# Patient Record
Sex: FEMALE | Race: WHITE
[De-identification: names, ages, dates, MRNs, and addresses within clinical notes are randomized per-mention and may not be internally consistent; named-entity substitution may affect disease eponyms.]

---

## 2018-01-05 ENCOUNTER — APPOINTMENT (OUTPATIENT)
Dept: CARDIOLOGY | Facility: CLINIC | Age: 83
End: 2018-01-05

## 2018-11-27 PROBLEM — Z00.00 ENCOUNTER FOR PREVENTIVE HEALTH EXAMINATION: Status: ACTIVE | Noted: 2018-11-27

## 2018-11-28 ENCOUNTER — APPOINTMENT (OUTPATIENT)
Dept: FAMILY MEDICINE | Facility: CLINIC | Age: 83
End: 2018-11-28
Payer: MEDICARE

## 2018-11-28 VITALS
DIASTOLIC BLOOD PRESSURE: 82 MMHG | TEMPERATURE: 98 F | HEART RATE: 69 BPM | BODY MASS INDEX: 24.77 KG/M2 | OXYGEN SATURATION: 96 % | SYSTOLIC BLOOD PRESSURE: 124 MMHG | HEIGHT: 58 IN | WEIGHT: 118 LBS

## 2018-11-28 DIAGNOSIS — M54.9 DORSALGIA, UNSPECIFIED: ICD-10-CM

## 2018-11-28 DIAGNOSIS — M25.552 PAIN IN LEFT HIP: ICD-10-CM

## 2018-11-28 DIAGNOSIS — S72.002S FRACTURE OF UNSPECIFIED PART OF NECK OF LEFT FEMUR, SEQUELA: ICD-10-CM

## 2018-11-28 DIAGNOSIS — G47.9 SLEEP DISORDER, UNSPECIFIED: ICD-10-CM

## 2018-11-28 DIAGNOSIS — G89.29 DORSALGIA, UNSPECIFIED: ICD-10-CM

## 2018-11-28 DIAGNOSIS — Z87.891 PERSONAL HISTORY OF NICOTINE DEPENDENCE: ICD-10-CM

## 2018-11-28 PROCEDURE — 99204 OFFICE O/P NEW MOD 45 MIN: CPT

## 2018-11-28 RX ORDER — CLONAZEPAM 0.5 MG/1
0.5 TABLET, ORALLY DISINTEGRATING ORAL
Refills: 0 | Status: DISCONTINUED | COMMUNITY
End: 2018-11-28

## 2018-11-28 NOTE — PHYSICAL EXAM
[Well Developed] : well developed [Well Nourished] : well nourished [No Respiratory Distress] : no respiratory distress  [Clear to Auscultation] : lungs were clear to auscultation bilaterally [No Edema] : there was no peripheral edema [Soft] : abdomen soft [Non Tender] : non-tender [Normal Mental Status] : the patient's orientation, memory, attention, language and fund of knowledge were normal [Appropriate] : appropriate [Impaired judgment] : intact judgment [Impaired Insight] : intact insight [de-identified] : Wheeze throughout [de-identified] : Loud murmur [de-identified] : Weight bearing with assistance and needed support to remain standing.

## 2018-11-28 NOTE — ASSESSMENT
[FreeTextEntry1] : Daughter needs assistance with mother's care.  Has borrowed equipment.  Needs physical therapy in home.

## 2018-11-28 NOTE — HISTORY OF PRESENT ILLNESS
[Family Member] : family member [de-identified] : New Patient visit in follow up from hospital. She has just come to live with her daughter. She had flare of ABM and Hgb in hospital was 7. At discharge Hgb up to 10. Plan had been for her to have colonoscopy in hospital but her daughter refused.\par Patient had been living with her son who had not been giving her medications on a consistent basis.  Multiple medications were in bottles.  Patient has had care in [place] and had been to [facility].\par Daughter wants her mom to establish care and see specialists. She has follow up with Dr Carr, hematology from hospital.  \par

## 2018-11-28 NOTE — REVIEW OF SYSTEMS
[Negative] : Heme/Lymph [Dyspnea on Exertion] : dyspnea on exertion [Melena] : melsiddharth [Abdominal Pain] : no abdominal pain [FreeTextEntry5] : Severe aortic stenosis, has not had valve replacement as she has ABM and would bleed if on anticoagulants [FreeTextEntry6] : Uses nebulizer BID for COPD. Not on any steroid inhaler.   [FreeTextEntry7] : To hospital due to melena and rest, fluids and transfusions if needed [FreeTextEntry9] : Lower back pain and left hip pain.Had hairline fracture but no surgery due to cardiac history and GI bleeding.  In wheelchair,  can stand with assistance.   [de-identified] : Chest port present in right upper chest.  [de-identified] : On seizure medication as she was having episodes of stiffening.  It was thought she was having reaction to Tramadol.

## 2018-11-28 NOTE — PLAN
[FreeTextEntry1] : Multiple specialty visits ordered. Daughter is starting with hematology and GI and has selected physicians. \par Will see [facility] cardiology and pulmonary. \par Needs foot care from podiatrist. \par Lasix dose decreased from 60 mg to 20 mg until blood work seen.  Patient is not on potassium supplements at this time.

## 2018-11-28 NOTE — HEALTH RISK ASSESSMENT
[Patient not ambulatory (Wheelchair)] : Patient is not ambulatory (Wheelchair) [Language] : difficulty with language [Cultural] : cultural [Transportation] : transportation [Retired] : retired [] : No [Reports changes in hearing] : Reports no changes in hearing [Reports changes in vision] : Reports no changes in vision [Reports changes in dental health] : Reports no changes in dental health [de-identified] : speaks Portuguese [de-identified] : daughter setting up home for mother [de-identified] : with daughter

## 2018-12-03 ENCOUNTER — NON-APPOINTMENT (OUTPATIENT)
Age: 83
End: 2018-12-03

## 2018-12-03 ENCOUNTER — APPOINTMENT (OUTPATIENT)
Dept: CARDIOLOGY | Facility: CLINIC | Age: 83
End: 2018-12-03
Payer: MEDICARE

## 2018-12-03 VITALS
HEART RATE: 68 BPM | HEIGHT: 58 IN | DIASTOLIC BLOOD PRESSURE: 75 MMHG | OXYGEN SATURATION: 98 % | WEIGHT: 118 LBS | SYSTOLIC BLOOD PRESSURE: 155 MMHG | BODY MASS INDEX: 24.77 KG/M2

## 2018-12-03 DIAGNOSIS — G83.14 MONOPLEGIA OF LOWER LIMB AFFECTING LEFT NONDOMINANT SIDE: ICD-10-CM

## 2018-12-03 DIAGNOSIS — K29.60 OTHER GASTRITIS W/OUT BLEEDING: ICD-10-CM

## 2018-12-03 DIAGNOSIS — G83.24 MONOPLEGIA OF UPPER LIMB AFFECTING LEFT NONDOMINANT SIDE: ICD-10-CM

## 2018-12-03 DIAGNOSIS — Z86.73 PERSONAL HISTORY OF TRANSIENT ISCHEMIC ATTACK (TIA), AND CEREBRAL INFARCTION W/OUT RESIDUAL DEFICITS: ICD-10-CM

## 2018-12-03 DIAGNOSIS — E78.5 HYPERLIPIDEMIA, UNSPECIFIED: ICD-10-CM

## 2018-12-03 DIAGNOSIS — Z80.3 FAMILY HISTORY OF MALIGNANT NEOPLASM OF BREAST: ICD-10-CM

## 2018-12-03 DIAGNOSIS — M81.0 AGE-RELATED OSTEOPOROSIS W/OUT CURRENT PATHOLOGICAL FRACTURE: ICD-10-CM

## 2018-12-03 DIAGNOSIS — K59.9 FUNCTIONAL INTESTINAL DISORDER, UNSPECIFIED: ICD-10-CM

## 2018-12-03 DIAGNOSIS — Z98.890 OTHER SPECIFIED POSTPROCEDURAL STATES: ICD-10-CM

## 2018-12-03 DIAGNOSIS — Z80.6 FAMILY HISTORY OF LEUKEMIA: ICD-10-CM

## 2018-12-03 PROCEDURE — 99204 OFFICE O/P NEW MOD 45 MIN: CPT | Mod: 25

## 2018-12-03 PROCEDURE — 93000 ELECTROCARDIOGRAM COMPLETE: CPT

## 2018-12-03 NOTE — REASON FOR VISIT
[Consultation] : a consultation regarding [Aortic Stenosis] : aortic stenosis [Hyperlipidemia] : hyperlipidemia [Hypertension] : hypertension

## 2018-12-03 NOTE — HISTORY OF PRESENT ILLNESS
[FreeTextEntry1] : Pt is a 89 y/o F who is referred here today by their PCP for evaluation.  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  Daughter is requesting O2\par [facility] 2 weeks ago for GIB - received blood transfusion and iron.  Colonoscopy refused\par Daughter states that they request mostly comfort care\par \par Smoking status: quit 2 yrs ago\par Current exercise: mostly wheelchair bound\par Daily water intake: 3-5 cups\par Daily caffeine intake: 2-3 expressos\par OTC medications: tylenol PRN\par Previous cardiac testing: as mentioned\par Previous hospitalizations: as mentioned

## 2018-12-03 NOTE — DISCUSSION/SUMMARY
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  \par Will repeat TTE to eval LV function and degree of AS\par She is currently on lasix 20mg, today she appears euvolemic to mildly volume overloaded - will c/w current dose.  Electrolytes are being checked this week.  \par SOB likely due to COPD and AS.  She is going to f/u with pulm for possible O2 therapy.  I have advised that if SOB become severe that she go straight to ER\par Will refer her to EP to establish care for ILR - today's ECG showed sinus rhythm with RBBB\par HTN: elevated today but acceptable, c/w current meds and will monitor\par HLD: c/w statin, labs being checked\par The described plan was discussed with the pt.  All questions and concerns were addressed to the best of my knowledge.

## 2018-12-03 NOTE — REVIEW OF SYSTEMS
[see HPI] : see HPI [Negative] : Heme/Lymph [Shortness Of Breath] : shortness of breath [Dyspnea on exertion] : not dyspnea during exertion [Chest Pain] : no chest pain [Lower Ext Edema] : no extremity edema [Leg Claudication] : no intermittent leg claudication [Palpitations] : no palpitations

## 2018-12-06 ENCOUNTER — APPOINTMENT (OUTPATIENT)
Dept: CARDIOLOGY | Facility: CLINIC | Age: 83
End: 2018-12-06

## 2018-12-06 ENCOUNTER — APPOINTMENT (OUTPATIENT)
Dept: FAMILY MEDICINE | Facility: CLINIC | Age: 83
End: 2018-12-06
Payer: MEDICARE

## 2018-12-06 VITALS
DIASTOLIC BLOOD PRESSURE: 64 MMHG | OXYGEN SATURATION: 96 % | HEART RATE: 76 BPM | SYSTOLIC BLOOD PRESSURE: 102 MMHG | BODY MASS INDEX: 24.77 KG/M2 | HEIGHT: 58 IN | WEIGHT: 118 LBS

## 2018-12-06 PROCEDURE — 99496 TRANSJ CARE MGMT HIGH F2F 7D: CPT

## 2018-12-07 NOTE — HISTORY OF PRESENT ILLNESS
[de-identified] : Hospital follow up. Patient in hospital for SOB. She had blood work, echo and chest Xray. Discharged on inhaler, antibiotics and steroids for 6 days.  20 mg prednisone for 3 days then 10 mg for 3 days. HgB at admission was 10.\par Discussion in hospital to have patient have TAVR procedure to relieve symptoms.  Daughter concerned about risk to mother vs benefit.  She saw her father intubated with feeding tubes and an uncomfortable two months in hospital till his death.  She has not had a discussion with her mother about this yet and wanted to discuss how to broach this topic.\par Daughter also does not want her mother on hospice as she wants her to be able to have transfusions when her HgB drops.

## 2018-12-07 NOTE — PLAN
[FreeTextEntry1] : Continue with antibiotics and steroids Follow up by phone if patient having side effects from steroids. \par Daughter plans to have a talk with her mother about advanced planning, wishes etc. Will go ahead with preliminary assessment for repair of aortic stenosis. Will be going to [facility] for testing. \par Follow up with Dr Ayon in [place] year in follow up to seeing him in hospital.   \par

## 2018-12-07 NOTE — PHYSICAL EXAM
[Well Developed] : well developed [Chronically Ill] : chronically ill [Uncomfortable] : uncomfortable [Looks Tired] : appears tired [Normal Voice Quality] : was normal [No Respiratory Distress] : no respiratory distress  [Normal Rate] : normal rate  [Regular Rhythm] : with a regular rhythm [Normal Mental Status] : the patient's orientation, memory, attention, language and fund of knowledge were normal [Appropriate] : appropriate [de-identified] : well dressed, hair done, [de-identified] : Wheeze and rhonchi throughout [de-identified] : Loud murmur [de-identified] : Pointing to right sided lower back pain

## 2018-12-07 NOTE — REVIEW OF SYSTEMS
[Shortness Of Breath] : shortness of breath [Wheezing] : wheezing [Cough] : cough [Back Pain] : back pain [Depression] : depression [Negative] : Heme/Lymph [Lower Ext Edema] : no lower extremity edema [FreeTextEntry9] : In wheelchair [de-identified] : Daughter concerned about her crying at night when she awakens. She tells her how depressed and sad she is.She is giving her one dose of clonazepam at Hs.  Regular pain medication of Tylenol is also helping with anxiety.

## 2018-12-11 DIAGNOSIS — K76.7 HEPATORENAL SYNDROME: ICD-10-CM

## 2018-12-21 ENCOUNTER — RX RENEWAL (OUTPATIENT)
Age: 83
End: 2018-12-21

## 2018-12-24 ENCOUNTER — RX RENEWAL (OUTPATIENT)
Age: 83
End: 2018-12-24

## 2018-12-27 ENCOUNTER — RX CHANGE (OUTPATIENT)
Age: 83
End: 2018-12-27

## 2018-12-27 ENCOUNTER — RX RENEWAL (OUTPATIENT)
Age: 83
End: 2018-12-27

## 2019-01-01 ENCOUNTER — RX RENEWAL (OUTPATIENT)
Age: 84
End: 2019-01-01

## 2019-01-01 ENCOUNTER — APPOINTMENT (OUTPATIENT)
Dept: FAMILY MEDICINE | Facility: CLINIC | Age: 84
End: 2019-01-01

## 2019-01-01 ENCOUNTER — CHART COPY (OUTPATIENT)
Age: 84
End: 2019-01-01

## 2019-01-01 ENCOUNTER — APPOINTMENT (OUTPATIENT)
Dept: FAMILY MEDICINE | Facility: CLINIC | Age: 84
End: 2019-01-01
Payer: MEDICARE

## 2019-01-01 ENCOUNTER — APPOINTMENT (OUTPATIENT)
Dept: ELECTROPHYSIOLOGY | Facility: CLINIC | Age: 84
End: 2019-01-01

## 2019-01-01 ENCOUNTER — APPOINTMENT (OUTPATIENT)
Dept: CARDIOLOGY | Facility: CLINIC | Age: 84
End: 2019-01-01

## 2019-01-01 VITALS
HEART RATE: 56 BPM | HEIGHT: 59 IN | SYSTOLIC BLOOD PRESSURE: 100 MMHG | BODY MASS INDEX: 25.2 KG/M2 | WEIGHT: 125 LBS | RESPIRATION RATE: 16 BRPM | DIASTOLIC BLOOD PRESSURE: 60 MMHG | OXYGEN SATURATION: 94 %

## 2019-01-01 VITALS
RESPIRATION RATE: 16 BRPM | DIASTOLIC BLOOD PRESSURE: 60 MMHG | WEIGHT: 125 LBS | OXYGEN SATURATION: 95 % | BODY MASS INDEX: 25.2 KG/M2 | TEMPERATURE: 98.6 F | HEART RATE: 82 BPM | SYSTOLIC BLOOD PRESSURE: 100 MMHG | HEIGHT: 59 IN

## 2019-01-01 VITALS
OXYGEN SATURATION: 96 % | BODY MASS INDEX: 25.2 KG/M2 | HEIGHT: 59 IN | TEMPERATURE: 98.4 F | WEIGHT: 125 LBS | RESPIRATION RATE: 16 BRPM | SYSTOLIC BLOOD PRESSURE: 126 MMHG | HEART RATE: 62 BPM | DIASTOLIC BLOOD PRESSURE: 66 MMHG

## 2019-01-01 VITALS
WEIGHT: 119 LBS | SYSTOLIC BLOOD PRESSURE: 106 MMHG | HEIGHT: 60 IN | OXYGEN SATURATION: 94 % | BODY MASS INDEX: 23.36 KG/M2 | HEART RATE: 74 BPM | DIASTOLIC BLOOD PRESSURE: 68 MMHG

## 2019-01-01 VITALS
WEIGHT: 125 LBS | DIASTOLIC BLOOD PRESSURE: 60 MMHG | BODY MASS INDEX: 25.2 KG/M2 | HEIGHT: 59 IN | HEART RATE: 64 BPM | SYSTOLIC BLOOD PRESSURE: 110 MMHG | OXYGEN SATURATION: 97 %

## 2019-01-01 VITALS
HEART RATE: 78 BPM | WEIGHT: 125 LBS | HEIGHT: 59 IN | TEMPERATURE: 99.1 F | BODY MASS INDEX: 25.2 KG/M2 | OXYGEN SATURATION: 94 % | SYSTOLIC BLOOD PRESSURE: 106 MMHG | DIASTOLIC BLOOD PRESSURE: 68 MMHG

## 2019-01-01 VITALS
BODY MASS INDEX: 25.2 KG/M2 | SYSTOLIC BLOOD PRESSURE: 100 MMHG | TEMPERATURE: 97.8 F | WEIGHT: 125 LBS | HEIGHT: 59 IN | DIASTOLIC BLOOD PRESSURE: 60 MMHG | OXYGEN SATURATION: 95 % | RESPIRATION RATE: 16 BRPM | HEART RATE: 64 BPM

## 2019-01-01 VITALS
WEIGHT: 128 LBS | DIASTOLIC BLOOD PRESSURE: 76 MMHG | SYSTOLIC BLOOD PRESSURE: 124 MMHG | BODY MASS INDEX: 25.8 KG/M2 | TEMPERATURE: 99.1 F | HEART RATE: 67 BPM | HEIGHT: 59 IN | OXYGEN SATURATION: 96 %

## 2019-01-01 VITALS
HEIGHT: 60 IN | OXYGEN SATURATION: 94 % | HEART RATE: 83 BPM | WEIGHT: 125 LBS | SYSTOLIC BLOOD PRESSURE: 124 MMHG | DIASTOLIC BLOOD PRESSURE: 60 MMHG | BODY MASS INDEX: 24.54 KG/M2 | RESPIRATION RATE: 16 BRPM

## 2019-01-01 VITALS
DIASTOLIC BLOOD PRESSURE: 70 MMHG | SYSTOLIC BLOOD PRESSURE: 130 MMHG | RESPIRATION RATE: 16 BRPM | OXYGEN SATURATION: 93 % | HEIGHT: 59 IN | BODY MASS INDEX: 25.2 KG/M2 | HEART RATE: 58 BPM | TEMPERATURE: 98.8 F | WEIGHT: 125 LBS

## 2019-01-01 VITALS
HEIGHT: 58 IN | BODY MASS INDEX: 26.03 KG/M2 | WEIGHT: 124 LBS | DIASTOLIC BLOOD PRESSURE: 84 MMHG | SYSTOLIC BLOOD PRESSURE: 130 MMHG | OXYGEN SATURATION: 95 % | HEART RATE: 66 BPM | RESPIRATION RATE: 16 BRPM | TEMPERATURE: 97.9 F

## 2019-01-01 VITALS
HEART RATE: 62 BPM | TEMPERATURE: 98.5 F | RESPIRATION RATE: 16 BRPM | BODY MASS INDEX: 25.2 KG/M2 | HEIGHT: 59 IN | SYSTOLIC BLOOD PRESSURE: 110 MMHG | WEIGHT: 125 LBS | DIASTOLIC BLOOD PRESSURE: 70 MMHG | OXYGEN SATURATION: 93 %

## 2019-01-01 VITALS
OXYGEN SATURATION: 95 % | TEMPERATURE: 98.2 F | BODY MASS INDEX: 25.2 KG/M2 | HEART RATE: 61 BPM | DIASTOLIC BLOOD PRESSURE: 60 MMHG | HEIGHT: 59 IN | WEIGHT: 125 LBS | SYSTOLIC BLOOD PRESSURE: 118 MMHG

## 2019-01-01 DIAGNOSIS — F32.9 MAJOR DEPRESSIVE DISORDER, SINGLE EPISODE, UNSPECIFIED: Chronic | ICD-10-CM

## 2019-01-01 DIAGNOSIS — I35.0 NONRHEUMATIC AORTIC (VALVE) STENOSIS: ICD-10-CM

## 2019-01-01 DIAGNOSIS — Z09 ENCOUNTER FOR FOLLOW-UP EXAMINATION AFTER COMPLETED TREATMENT FOR CONDITIONS OTHER THAN MALIGNANT NEOPLASM: ICD-10-CM

## 2019-01-01 DIAGNOSIS — Z86.69 PERSONAL HISTORY OF OTHER DISEASES OF THE NERVOUS SYSTEM AND SENSE ORGANS: ICD-10-CM

## 2019-01-01 DIAGNOSIS — Z23 ENCOUNTER FOR IMMUNIZATION: ICD-10-CM

## 2019-01-01 DIAGNOSIS — Z79.899 OTHER LONG TERM (CURRENT) DRUG THERAPY: ICD-10-CM

## 2019-01-01 DIAGNOSIS — F41.9 ANXIETY DISORDER, UNSPECIFIED: ICD-10-CM

## 2019-01-01 DIAGNOSIS — I50.9 HEART FAILURE, UNSPECIFIED: ICD-10-CM

## 2019-01-01 DIAGNOSIS — R06.00 DYSPNEA, UNSPECIFIED: ICD-10-CM

## 2019-01-01 DIAGNOSIS — J44.9 CHRONIC OBSTRUCTIVE PULMONARY DISEASE, UNSPECIFIED: ICD-10-CM

## 2019-01-01 DIAGNOSIS — Z87.09 PERSONAL HISTORY OF OTHER DISEASES OF THE RESPIRATORY SYSTEM: ICD-10-CM

## 2019-01-01 DIAGNOSIS — M25.562 PAIN IN LEFT KNEE: ICD-10-CM

## 2019-01-01 DIAGNOSIS — L30.9 DERMATITIS, UNSPECIFIED: ICD-10-CM

## 2019-01-01 DIAGNOSIS — G40.909 EPILEPSY, UNSPECIFIED, NOT INTRACTABLE, W/OUT STATUS EPILEPTICUS: ICD-10-CM

## 2019-01-01 DIAGNOSIS — M79.673 PAIN IN UNSPECIFIED FOOT: ICD-10-CM

## 2019-01-01 DIAGNOSIS — H18.52 EPITHELIAL (JUVENILE) CORNEAL DYSTROPHY: ICD-10-CM

## 2019-01-01 DIAGNOSIS — J44.1 CHRONIC OBSTRUCTIVE PULMONARY DISEASE WITH (ACUTE) EXACERBATION: ICD-10-CM

## 2019-01-01 DIAGNOSIS — G81.94 HEMIPLEGIA, UNSPECIFIED AFFECTING LEFT NONDOMINANT SIDE: ICD-10-CM

## 2019-01-01 DIAGNOSIS — D50.0 IRON DEFICIENCY ANEMIA SECONDARY TO BLOOD LOSS (CHRONIC): ICD-10-CM

## 2019-01-01 DIAGNOSIS — R26.81 UNSTEADINESS ON FEET: ICD-10-CM

## 2019-01-01 DIAGNOSIS — M25.469 EFFUSION, UNSPECIFIED KNEE: ICD-10-CM

## 2019-01-01 DIAGNOSIS — J43.9 EMPHYSEMA, UNSPECIFIED: ICD-10-CM

## 2019-01-01 DIAGNOSIS — R05 COUGH: ICD-10-CM

## 2019-01-01 LAB
ALBUMIN SERPL ELPH-MCNC: 3.9 G/DL
ANION GAP SERPL CALC-SCNC: 12 MMOL/L
BUN SERPL-MCNC: 29 MG/DL
CALCIUM SERPL-MCNC: 9.3 MG/DL
CHLORIDE SERPL-SCNC: 102 MMOL/L
CO2 SERPL-SCNC: 26 MMOL/L
CREAT SERPL-MCNC: 0.92 MG/DL
GLUCOSE SERPL-MCNC: 127 MG/DL
PHOSPHATE SERPL-MCNC: 2.8 MG/DL
POTASSIUM SERPL-SCNC: 4.7 MMOL/L
SODIUM SERPL-SCNC: 140 MMOL/L

## 2019-01-01 PROCEDURE — G0008: CPT

## 2019-01-01 PROCEDURE — 99214 OFFICE O/P EST MOD 30 MIN: CPT

## 2019-01-01 PROCEDURE — 99215 OFFICE O/P EST HI 40 MIN: CPT

## 2019-01-01 PROCEDURE — G0444 DEPRESSION SCREEN ANNUAL: CPT | Mod: 59

## 2019-01-01 PROCEDURE — 99496 TRANSJ CARE MGMT HIGH F2F 7D: CPT

## 2019-01-01 PROCEDURE — 90662 IIV NO PRSV INCREASED AG IM: CPT

## 2019-01-01 PROCEDURE — 99214 OFFICE O/P EST MOD 30 MIN: CPT | Mod: 25

## 2019-01-01 PROCEDURE — 99213 OFFICE O/P EST LOW 20 MIN: CPT

## 2019-01-01 PROCEDURE — 99213 OFFICE O/P EST LOW 20 MIN: CPT | Mod: 25

## 2019-01-01 RX ORDER — CEFPROZIL 500 MG/1
500 TABLET ORAL
Qty: 20 | Refills: 0 | Status: DISCONTINUED | COMMUNITY
Start: 2019-01-01 | End: 2019-01-01

## 2019-01-01 RX ORDER — ALBUTEROL SULFATE 2.5 MG/3ML
(2.5 MG/3ML) SOLUTION RESPIRATORY (INHALATION)
Qty: 1 | Refills: 3 | Status: ACTIVE | COMMUNITY
Start: 2018-11-28 | End: 1900-01-01

## 2019-01-01 RX ORDER — POTASSIUM CHLORIDE 750 MG/1
10 TABLET, FILM COATED, EXTENDED RELEASE ORAL
Qty: 90 | Refills: 3 | Status: ACTIVE | COMMUNITY
Start: 2018-11-29

## 2019-01-01 RX ORDER — ACETAMINOPHEN AND CODEINE 300; 30 MG/1; MG/1
300-30 TABLET ORAL TWICE DAILY
Qty: 14 | Refills: 0 | Status: DISCONTINUED | COMMUNITY
Start: 2018-11-28 | End: 2019-01-01

## 2019-01-01 RX ORDER — DEXLANSOPRAZOLE 60 MG/1
60 CAPSULE, DELAYED RELEASE ORAL DAILY
Qty: 90 | Refills: 1 | Status: ACTIVE | COMMUNITY
Start: 2019-01-01 | End: 1900-01-01

## 2019-01-01 RX ORDER — AMOXICILLIN AND CLAVULANATE POTASSIUM 875; 125 MG/1; MG/1
875-125 TABLET, COATED ORAL
Qty: 10 | Refills: 1 | Status: DISCONTINUED | COMMUNITY
Start: 2019-01-01 | End: 2019-01-01

## 2019-01-01 RX ORDER — LACOSAMIDE 100 MG/1
100 TABLET, FILM COATED ORAL
Qty: 60 | Refills: 3 | Status: ACTIVE | COMMUNITY
Start: 2019-01-01 | End: 1900-01-01

## 2019-01-01 RX ORDER — AZITHROMYCIN 250 MG/1
250 TABLET, FILM COATED ORAL
Qty: 1 | Refills: 0 | Status: DISCONTINUED | COMMUNITY
Start: 2019-01-01 | End: 2019-01-01

## 2019-01-01 RX ORDER — LIDOCAINE 5 G/100G
5 OINTMENT TOPICAL
Qty: 1 | Refills: 3 | Status: ACTIVE | COMMUNITY
Start: 2019-01-01 | End: 1900-01-01

## 2019-01-01 RX ORDER — FUROSEMIDE 20 MG/1
20 TABLET ORAL
Qty: 90 | Refills: 1 | Status: DISCONTINUED | COMMUNITY
Start: 2019-01-01 | End: 2019-01-01

## 2019-01-01 RX ORDER — CLONAZEPAM 0.5 MG/1
0.5 TABLET ORAL
Qty: 60 | Refills: 0 | Status: ACTIVE | COMMUNITY
Start: 2018-11-28 | End: 1900-01-01

## 2019-01-01 RX ORDER — FLUTICASONE FUROATE, UMECLIDINIUM BROMIDE AND VILANTEROL TRIFENATATE 100; 62.5; 25 UG/1; UG/1; UG/1
100-62.5-25 POWDER RESPIRATORY (INHALATION)
Qty: 1 | Refills: 5 | Status: ACTIVE | COMMUNITY
Start: 2019-01-01 | End: 1900-01-01

## 2019-01-01 RX ORDER — ESCITALOPRAM OXALATE 5 MG/1
5 TABLET ORAL
Qty: 30 | Refills: 0 | Status: DISCONTINUED | COMMUNITY
Start: 2018-11-28 | End: 2019-01-01

## 2019-01-01 RX ORDER — METHYLPREDNISOLONE 4 MG/1
4 TABLET ORAL
Qty: 1 | Refills: 0 | Status: DISCONTINUED | COMMUNITY
Start: 2019-01-01 | End: 2019-01-01

## 2019-01-01 RX ORDER — DICLOFENAC SODIUM 10 MG/G
1 GEL TOPICAL DAILY
Qty: 3 | Refills: 3 | Status: COMPLETED | COMMUNITY
Start: 2018-11-29 | End: 2019-01-01

## 2019-01-01 RX ORDER — ATORVASTATIN CALCIUM 10 MG/1
10 TABLET, FILM COATED ORAL DAILY
Qty: 90 | Refills: 1 | Status: ACTIVE | COMMUNITY
Start: 2018-11-28 | End: 1900-01-01

## 2019-01-01 RX ORDER — AZITHROMYCIN 250 MG/1
250 TABLET, FILM COATED ORAL
Qty: 6 | Refills: 0 | Status: DISCONTINUED | COMMUNITY
Start: 2018-12-05

## 2019-01-01 RX ORDER — LIDOCAINE HYDROCHLORIDE 20 MG/ML
2 JELLY TOPICAL
Qty: 3 | Refills: 3 | Status: ACTIVE | COMMUNITY
Start: 2018-11-29 | End: 1900-01-01

## 2019-01-01 RX ORDER — TORSEMIDE 20 MG/1
20 TABLET ORAL
Qty: 30 | Refills: 3 | Status: ACTIVE | COMMUNITY
Start: 2019-01-01 | End: 1900-01-01

## 2019-01-01 RX ORDER — PANTOPRAZOLE 40 MG/1
40 TABLET, DELAYED RELEASE ORAL
Qty: 180 | Refills: 1 | Status: ACTIVE | COMMUNITY
Start: 2018-11-28 | End: 1900-01-01

## 2019-01-01 RX ORDER — BENZONATATE 100 MG/1
100 CAPSULE ORAL 3 TIMES DAILY
Qty: 30 | Refills: 3 | Status: ACTIVE | COMMUNITY
Start: 2018-12-21 | End: 1900-01-01

## 2019-01-01 RX ORDER — METHYLPREDNISOLONE 4 MG/1
4 TABLET ORAL
Qty: 21 | Refills: 0 | Status: DISCONTINUED | COMMUNITY
Start: 2018-12-27

## 2019-01-01 RX ORDER — PREDNISONE 10 MG/1
10 TABLET ORAL
Qty: 9 | Refills: 0 | Status: DISCONTINUED | COMMUNITY
Start: 2018-12-05

## 2019-01-01 RX ORDER — LOSARTAN POTASSIUM 25 MG/1
25 TABLET, FILM COATED ORAL DAILY
Qty: 90 | Refills: 3 | Status: ACTIVE | COMMUNITY
Start: 1900-01-01 | End: 1900-01-01

## 2019-01-01 RX ORDER — FOLIC ACID 1 MG/1
1 TABLET ORAL
Qty: 90 | Refills: 1 | Status: ACTIVE | COMMUNITY
Start: 2018-11-28 | End: 1900-01-01

## 2019-01-01 RX ORDER — POTASSIUM CHLORIDE 750 MG/1
10 TABLET, EXTENDED RELEASE ORAL
Refills: 0 | Status: ACTIVE | COMMUNITY
Start: 2019-01-01

## 2019-01-01 RX ORDER — ALBUTEROL SULFATE 90 UG/1
108 (90 BASE) AEROSOL, METERED RESPIRATORY (INHALATION)
Qty: 1 | Refills: 2 | Status: ACTIVE | COMMUNITY
Start: 2019-01-01 | End: 1900-01-01

## 2019-01-01 RX ORDER — LACOSAMIDE 200 MG/1
TABLET, FILM COATED ORAL
Refills: 0 | Status: ACTIVE | COMMUNITY

## 2019-01-01 RX ORDER — ACETYLCYSTEINE 200 MG/ML
20 SOLUTION ORAL; RESPIRATORY (INHALATION)
Qty: 1 | Refills: 4 | Status: ACTIVE | COMMUNITY
Start: 2019-01-01 | End: 1900-01-01

## 2019-01-01 RX ORDER — DICLOFENAC SODIUM 10 MG/G
1 GEL TOPICAL DAILY
Qty: 1 | Refills: 2 | Status: ACTIVE | COMMUNITY
Start: 2019-01-01 | End: 1900-01-01

## 2019-01-01 RX ORDER — LOSARTAN POTASSIUM 50 MG/1
50 TABLET, FILM COATED ORAL
Qty: 30 | Refills: 0 | Status: DISCONTINUED | COMMUNITY
Start: 2018-11-28

## 2019-01-01 RX ORDER — LIDOCAINE HYDROCHLORIDE 20 MG/ML
2 JELLY TOPICAL
Qty: 90 | Refills: 0 | Status: ACTIVE | COMMUNITY
Start: 2018-11-29

## 2019-01-01 RX ORDER — CEFPROZIL 500 MG/1
500 TABLET ORAL
Qty: 20 | Refills: 0 | Status: ACTIVE | COMMUNITY
Start: 2019-01-01 | End: 1900-01-01

## 2019-01-01 RX ORDER — PREDNISONE 20 MG/1
20 TABLET ORAL
Qty: 30 | Refills: 0 | Status: ACTIVE | COMMUNITY
Start: 2019-01-01

## 2019-01-01 RX ORDER — FLUTICASONE FUROATE AND VILANTEROL TRIFENATATE 200; 25 UG/1; UG/1
200-25 POWDER RESPIRATORY (INHALATION)
Qty: 3 | Refills: 1 | Status: DISCONTINUED | COMMUNITY
Start: 2019-01-01 | End: 2019-01-01

## 2019-01-01 RX ORDER — AMOXICILLIN AND CLAVULANATE POTASSIUM 500; 125 MG/1; MG/1
500-125 TABLET, FILM COATED ORAL
Qty: 14 | Refills: 0 | Status: DISCONTINUED | COMMUNITY
Start: 2018-12-27

## 2019-01-01 RX ORDER — ESCITALOPRAM OXALATE 10 MG/1
10 TABLET ORAL
Qty: 135 | Refills: 3 | Status: ACTIVE | COMMUNITY
Start: 2018-11-28 | End: 1900-01-01

## 2019-01-01 RX ORDER — UMECLIDINIUM 62.5 UG/1
62.5 AEROSOL, POWDER ORAL
Qty: 3 | Refills: 1 | Status: DISCONTINUED | COMMUNITY
Start: 2018-12-05 | End: 2019-01-01

## 2019-01-24 NOTE — PHYSICAL EXAM
[Well Developed] : well developed [Well Nourished] : well nourished [No Respiratory Distress] : no respiratory distress  [Clear to Auscultation] : lungs were clear to auscultation bilaterally [No Accessory Muscle Use] : no accessory muscle use [Normal Rate] : normal rate  [Regular Rhythm] : with a regular rhythm [No Edema] : there was no peripheral edema [de-identified] : Loud murmur [de-identified] : Weeping at times but stops with conversation.

## 2019-01-24 NOTE — PLAN
[FreeTextEntry1] : Increase dose of Lexapro, clonazepam at hs.\par Continue with current treatment plan and specialist care.

## 2019-01-24 NOTE — HISTORY OF PRESENT ILLNESS
[FreeTextEntry8] : Follow up on cough and congestion. She saw cardiology yesterday and they listened to her chest and found it clear. Patient is not coughing more than usual. \par Hgb 9.0 right now saw hematology and not low enough for Procrit. \par Crying all the time when out in public. This is distressing for her daughter.  She is too sleepy when taking clonazepam in the daytime. She only gives it at night now. She stops crying when she gets home.

## 2019-03-06 PROBLEM — M25.562 LEFT KNEE PAIN: Status: ACTIVE | Noted: 2019-01-01

## 2019-03-06 PROBLEM — M25.469 KNEE SWELLING: Status: ACTIVE | Noted: 2019-01-01

## 2019-03-06 NOTE — PLAN
[FreeTextEntry1] : Plan is for patient to go to [facility]'s ER for assessment. Multiple complex medical problems. Requires full care from family. Will have immediate orthopedic assessment in ER.

## 2019-03-06 NOTE — HISTORY OF PRESENT ILLNESS
[Family Member] : family member [FreeTextEntry8] : Severe left knee pain started last night. Left knee is also swollen now.  No fall or injury. Patient is mobile in her wheelchair moving her legs. Too painful to move her legs today. Her family cares for her and uses Voltaren gel on her knees on a usual basis. No relief with this now. \par Patient had asked to go to ER as her pain was so bad.

## 2019-04-04 PROBLEM — G40.909 SEIZURE DISORDER: Status: ACTIVE | Noted: 2018-11-28

## 2019-04-04 NOTE — REVIEW OF SYSTEMS
[Itching] : itching [Shortness Of Breath] : shortness of breath [Wheezing] : wheezing [Negative] : Heme/Lymph [FreeTextEntry8] : Uses commode chair at home [de-identified] : In wheelchair, not mobilizing

## 2019-04-04 NOTE — HISTORY OF PRESENT ILLNESS
[Family Member] : family member [de-identified] : Visit for completion of forms for home care. Daughter concerned about mother's increasing fatigue. \par Also large bunion on left foot is painful for patient. She wears comfortable shoes with stretch sides but still has pain. \par Visit in [place] next week for blood work and Procrit.  Needs medication refills, will be staying with daughter-in-law for a week.

## 2019-04-04 NOTE — PHYSICAL EXAM
[Well Developed] : well developed [Well Nourished] : well nourished [Looks Tired] : appears tired [No Respiratory Distress] : no respiratory distress  [No Accessory Muscle Use] : no accessory muscle use [Normal Affect] : the affect was normal [Normal Mood] : the mood was normal [de-identified] : Slight pink irritation bilateral suborbital.  [de-identified] : Scattered rhonchi [de-identified] : Loud murmur heard throughout chest [de-identified] : In wheelchair,  Large bunion left foot, cushioned pad on bunion and second toe

## 2019-04-08 PROBLEM — L30.9 DERMATITIS: Status: ACTIVE | Noted: 2019-01-01

## 2019-04-08 NOTE — PHYSICAL EXAM
[No Acute Distress] : no acute distress [Well Nourished] : well nourished [Well Developed] : well developed [Well-Appearing] : well-appearing [Normal Sclera/Conjunctiva] : normal sclera/conjunctiva [Normal Outer Ear/Nose] : the outer ears and nose were normal in appearance [Normal Oropharynx] : the oropharynx was normal [Supple] : supple [No Lymphadenopathy] : no lymphadenopathy [Thyroid Normal, No Nodules] : the thyroid was normal and there were no nodules present [de-identified] : inspirator and expiratory wheeze bilaterally

## 2019-04-08 NOTE — HISTORY OF PRESENT ILLNESS
[FreeTextEntry8] : here with daughters for recent onset of cough congestion pnd sinus pressure with discharge no fever otc not effective also blisters on right foot painful

## 2019-04-08 NOTE — REVIEW OF SYSTEMS
[Vision Problems] : vision problems [Nasal Discharge] : nasal discharge [Postnasal Drip] : postnasal drip [Cough] : cough [Negative] : Cardiovascular

## 2019-04-09 PROBLEM — M79.673 PAIN IN FOOT: Status: ACTIVE | Noted: 2019-01-01

## 2019-05-08 PROBLEM — R26.81 GAIT INSTABILITY: Status: ACTIVE | Noted: 2019-01-01

## 2019-05-11 PROBLEM — F41.9 ANXIETY: Status: ACTIVE | Noted: 2018-11-28

## 2019-05-11 NOTE — HISTORY OF PRESENT ILLNESS
[FreeTextEntry8] : \par 89 year old female presents with her daughter for evaluation of worsening cough\par has COPD- on Trelegy, has Albuterol via nebulizer, does follow with pulmonology \par has had worsening cough with wheezing and mucous production, sob over the past 3-4 days\par no fever\par \par has anxiety- requests refill for Clonazepam which helps, mainly takes at night

## 2019-05-11 NOTE — PHYSICAL EXAM
[No Acute Distress] : no acute distress [Well Nourished] : well nourished [Well Developed] : well developed [Normal Oropharynx] : the oropharynx was normal [Normal Appearance] : was normal in appearance [Normal Rate] : normal rate  [Neck Supple] : was supple [No Respiratory Distress] : no respiratory distress  [No Edema] : there was no peripheral edema [Normal S1, S2] : normal S1 and S2 [Regular Rhythm] : with a regular rhythm [Normal Anterior Cervical Nodes] : no anterior cervical lymphadenopathy [de-identified] : bilateral rhonchi and wheezing [de-identified] : Alert [de-identified] : Bilateral maxillary sinus tenderness; Postnasal drip present [de-identified] : sitting in a wheelchair

## 2019-05-11 NOTE — REVIEW OF SYSTEMS
[Shortness Of Breath] : shortness of breath [Wheezing] : wheezing [Cough] : cough [Fever] : no fever [Chest Pain] : no chest pain [FreeTextEntry4] : sinus congestion [Chills] : no chills

## 2019-05-11 NOTE — ASSESSMENT
[FreeTextEntry1] : Likely with COPD exacerbation, Sinusitis\par take Azithromycin as directed, take Medrol dose pack as directed\par c/w Trelegy inhaler, c/w Albuterol via nebulizer\par \par Anxiety- c/w Clonazepam as directed when needed\par [facility] reviewed, Reference #: [id number]\par \par return or call if symptoms worsen or don't improve

## 2019-06-03 PROBLEM — R26.81 UNSTEADY GAIT: Status: ACTIVE | Noted: 2019-01-01

## 2019-07-26 PROBLEM — G81.94 HEMIPARESIS, LEFT: Status: ACTIVE | Noted: 2019-01-01

## 2019-07-26 NOTE — PHYSICAL EXAM
[Normal] : no carotid or abdominal bruits heard, no varicosities, pedal pulses are present, no peripheral edema, no extremity clubbing or cyanosis and no palpable aorta [de-identified] : Decreased breath sounds scattered rhonchi

## 2019-07-26 NOTE — HISTORY OF PRESENT ILLNESS
[FreeTextEntry8] : 3 days of loose cough with brown phlegm patient has COPD on multiple inhalers no chills or sweats no dyspnea at rest patient does not ambulate secondary to stroke symptoms began after routine blood transfusion chronic disease followed by Dr. Carr

## 2019-08-12 NOTE — HISTORY OF PRESENT ILLNESS
[FreeTextEntry8] : here with daughters recent onset of cough congestion pnd sinus pressure with discahrge no fever also med renewals speaks no english daughter states pt depressed recently  lost son

## 2019-08-12 NOTE — PHYSICAL EXAM
[Normal] : no respiratory distress, lungs were clear to auscultation bilaterally and no accessory muscle use [de-identified] : pnd congestion

## 2019-08-30 PROBLEM — Z87.09 HISTORY OF CHRONIC OBSTRUCTIVE LUNG DISEASE: Status: RESOLVED | Noted: 2018-11-28 | Resolved: 2019-01-01

## 2019-08-30 PROBLEM — Z79.899 LONG TERM USE OF DRUG: Status: RESOLVED | Noted: 2018-11-29 | Resolved: 2019-01-01

## 2019-08-30 PROBLEM — Z86.69 HISTORY OF SEIZURE DISORDER: Status: RESOLVED | Noted: 2018-11-28 | Resolved: 2019-01-01

## 2019-08-30 PROBLEM — Z09 HOSPITAL DISCHARGE FOLLOW-UP: Status: ACTIVE | Noted: 2018-12-07

## 2019-08-30 NOTE — ASSESSMENT
[FreeTextEntry1] : Will add acetylcysteine nebulizer to albuterol nebulizers to mobilize mucus.\par \par Followup with pulmonary

## 2019-08-30 NOTE — PHYSICAL EXAM
[de-identified] : Patient coughing with eyes closed throughout visit [No Edema] : there was no peripheral edema [de-identified] : 2/6 systolic ejection murmur [de-identified] : Scattered rhonchi. Decreased breath sounds

## 2019-08-30 NOTE — HISTORY OF PRESENT ILLNESS
[de-identified] : Discharged yesterday After a second admission for exacerbation of COPD, possible minus congestive heart failure She was told she had a mucous plug. Discharged on tapering dose of prednisone. No antibiotic, albuterol nebulizer twice a day, and Trelgy\par \par Patient continues to have loose cough, which is no longer productive\par \par Cardiology proposes TAVR. this was discussed with her daughter her quality of life is very poor. She cannot tolerate aspirin due to GI bleeding. She will not pursue this. Daughter is aware of that aortic stenosis may cause death

## 2019-08-30 NOTE — REVIEW OF SYSTEMS
[Wheezing] : wheezing [Shortness Of Breath] : shortness of breath [Negative] : Cardiovascular [Cough] : cough

## 2019-10-01 PROBLEM — R05 PERSISTENT COUGH: Status: ACTIVE | Noted: 2018-12-21

## 2019-10-01 NOTE — PHYSICAL EXAM
[Normal Rate] : normal rate  [Normal] : affect was normal and insight and judgment were intact [No Edema] : there was no peripheral edema [de-identified] : Rhonchi throughout posterior, some wheeze anterior.  Not able to take full deep breath. [de-identified] : Loud murmur [de-identified] : Front of left foot red/bluish colour and blanches. Foot warm.  [de-identified] : in wheelchair

## 2019-10-01 NOTE — HISTORY OF PRESENT ILLNESS
[Family Member] : family member [FreeTextEntry8] : Cough for two days and some SOB. No fever or chills. \par She was recently in hospital for five days. Aortic stenosis is getting worse. Had three units of packed cells. Hgb 7.5 when she left the hospital. 11.2 at hematology yesterday. Seen by hematology weekly. \par Not urinating as much as usual. Front of left foot blue/red colour. \par Daughter in for appointment and translated our conversations.

## 2019-10-01 NOTE — PLAN
[FreeTextEntry1] : Cough and SOB probably due to fluid retention and heart function.\par Take Lasix for BID for two days. Maintain potassium intake. Daughter to follow up by phone. \par If fever or increasing symptoms to ER or fu in office.

## 2019-10-30 PROBLEM — J44.9 COPD (CHRONIC OBSTRUCTIVE PULMONARY DISEASE): Status: ACTIVE | Noted: 2018-11-28

## 2019-10-30 PROBLEM — R06.00 DYSPNEA: Status: ACTIVE | Noted: 2018-12-03

## 2019-10-30 PROBLEM — F32.9 DEPRESSION: Chronic | Status: ACTIVE | Noted: 2018-11-28

## 2019-10-30 PROBLEM — J43.9 EMPHYSEMA LUNG: Status: ACTIVE | Noted: 2018-11-28

## 2019-10-30 PROBLEM — Z23 FLU VACCINE NEED: Status: ACTIVE | Noted: 2019-01-01

## 2019-10-30 NOTE — REVIEW OF SYSTEMS
[Fatigue] : fatigue [Shortness Of Breath] : shortness of breath [Cough] : cough [Dyspnea on Exertion] : dyspnea on exertion [Depression] : depression [Negative] : Heme/Lymph [Insomnia] : no insomnia

## 2019-10-30 NOTE — PLAN
[FreeTextEntry1] : Use prednisone PRN for exacerbations. Increase nebulizer treatment PRN.\par Deep breathe and cough once an hour in daytime. Chest physiotherapy from family PRN.

## 2019-10-30 NOTE — PHYSICAL EXAM
[Normal] : no jugular venous distention, supple, no lymphadenopathy and the thyroid was normal and there were no nodules present [Regular Rhythm] : with a regular rhythm [No Edema] : there was no peripheral edema [Rounded] : rounded [Anxious] : anxious [de-identified] : Colour pink. [de-identified] : Rhonchi throughout. Productive cough.  [de-identified] : loud murmur.  [de-identified] : Crying at times

## 2019-10-30 NOTE — HISTORY OF PRESENT ILLNESS
[Family Member] : family member [FreeTextEntry8] : Family concerned about her depression. She cries a lot and is depressed. Doing better on Lexapro but would like to try higher dose.\par Hgb very low at 6 and had transfusion this week. Also getting iron infusions starting today.\par She has seen pulmonary and had prednisone 20 mg BID for cough and congestion. Trelegy once a day and albuterol nebulizer once a day. Cough continues. No fever or chills. \par Daughter and family member translate during visit. Very interactive.

## 2019-11-15 PROBLEM — I35.0 AORTIC VALVE STENOSIS: Status: ACTIVE | Noted: 2018-11-28

## 2019-11-15 PROBLEM — D50.0 ANEMIA, BLOOD LOSS: Status: ACTIVE | Noted: 2018-11-28

## 2019-11-15 NOTE — PLAN
[FreeTextEntry1] : Patient is high risk with multiple medical concerns. Daughter will be away and only cousin to care for her.\par Unsure if she has pneumonia. Daughter more comfortable if her mother is in hospital and monitored while she is away. Last time she was coughing and ill her heart had briefly stopped. This showed on her event monitor. \par To [facility] ER. \par Attempted to call triage but put on hold for over 10 minutes. Daughter aware. Has medication and problem list.

## 2019-11-15 NOTE — PHYSICAL EXAM
[Normal] : no acute distress, well nourished, well developed and well-appearing [de-identified] : Tired looking, in wheelchair [de-identified] : Decreased air entry to bases.  Expiratory wheeze and rhonchi throughout. Not taking deep breaths. Not coughing. [de-identified] : Irregular heart rate. Murmur [de-identified] : Responsive to questions, answers her daughter correctly, not confused at this time.

## 2019-11-15 NOTE — HISTORY OF PRESENT ILLNESS
[Family Member] : family member [FreeTextEntry8] : Daughter concerned about her mother's breathing. More SOB and congested than usual. Seen by cardiology this week and hematology yesterday. She was started on prednisone 20 mg bid yesterday from hematology and was to start on antibiotics but no script sent to pharmacy. CT of chest is scheduled for today. \par Patient's daughter leaving tomorrow for a trip.\par Patient fell out of wheelchair two days ago. She hit her face and head when she landed. ER evaluation was negative.  Patient bruised and scrapes on her forehead.  There is now a seat belt on her wheelchair.

## 2019-11-25 PROBLEM — I50.9 CHF, CHRONIC: Status: ACTIVE | Noted: 2019-01-01

## 2019-11-25 PROBLEM — I35.0 AORTIC STENOSIS: Status: ACTIVE | Noted: 2018-11-28

## 2019-11-25 PROBLEM — J44.1 COPD EXACERBATION: Status: ACTIVE | Noted: 2019-01-01

## 2019-11-25 PROBLEM — H18.52 ABM (ANTERIOR BASEMENT MEMBRANE) DYSTROPHY: Status: ACTIVE | Noted: 2018-11-28

## 2019-11-25 NOTE — HISTORY OF PRESENT ILLNESS
[de-identified] : Patient was hospitalized with worsening shortness of breath secondary to congestive heart failure secondary to severe aortic stenosis patient is not a candidate for valve replacement due to inability to take aspirin daughter is here and she is aware that this is a terminal condition.  She diuresed well in the hospital on torsemide ankle edema decreased significantly shortness of breath has improved dramatically

## 2019-11-25 NOTE — PHYSICAL EXAM
[No Acute Distress] : no acute distress [Normal Rate] : normal rate  [de-identified] : Chest is clear [de-identified] : 3/6 systolic ejection murmur aorta [de-identified] : No edema

## 2019-11-25 NOTE — REVIEW OF SYSTEMS
[Fatigue] : fatigue [Shortness Of Breath] : shortness of breath [Negative] : Cardiovascular [FreeTextEntry2] : Extremely debilitated in a wheelchair

## 2019-11-25 NOTE — ASSESSMENT
[FreeTextEntry1] : Resolved CHF decrease torsemide to 20 mg every other day decrease potassium to every other day\par \par COPD decrease prednisone to 20 mg every other day\par \par Electrolytes

## 2020-12-21 PROBLEM — Z87.09 HISTORY OF ACUTE SINUSITIS: Status: RESOLVED | Noted: 2019-01-01 | Resolved: 2020-12-21
